# Patient Record
Sex: FEMALE | Race: OTHER | Employment: STUDENT | URBAN - METROPOLITAN AREA
[De-identification: names, ages, dates, MRNs, and addresses within clinical notes are randomized per-mention and may not be internally consistent; named-entity substitution may affect disease eponyms.]

---
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
## 2024-01-16 RX ORDER — TRETINOIN 1 MG/G
1 CREAM TOPICAL
Refills: 0 | OUTPATIENT
Start: 2024-01-16

## 2024-03-08 ENCOUNTER — OFFICE VISIT (OUTPATIENT)
Dept: DERMATOLOGY CLINIC | Facility: CLINIC | Age: 19
End: 2024-03-08

## 2024-03-08 DIAGNOSIS — L70.0 ACNE VULGARIS: Primary | ICD-10-CM

## 2024-03-08 PROCEDURE — 99213 OFFICE O/P EST LOW 20 MIN: CPT | Performed by: DERMATOLOGY

## 2024-03-08 RX ORDER — CLINDAMYCIN PHOSPHATE 10 UG/ML
1 LOTION TOPICAL 2 TIMES DAILY
Qty: 60 ML | Refills: 5 | Status: SHIPPED | OUTPATIENT
Start: 2024-03-08

## 2024-03-08 RX ORDER — TRETINOIN 1 MG/G
CREAM TOPICAL
Qty: 20 G | Refills: 3 | Status: SHIPPED | OUTPATIENT
Start: 2024-03-08

## 2024-03-08 RX ORDER — SPIRONOLACTONE 25 MG/1
TABLET ORAL
Qty: 60 TABLET | Refills: 3 | Status: SHIPPED | OUTPATIENT
Start: 2024-03-08

## 2024-03-18 NOTE — PROGRESS NOTES
Ambreen Moeller is a 18 year old female.    Chief Complaint   Patient presents with    Acne     LOV 4/8/22. Pt presenting for f/u on acne. States she has only been using OTC acne cleansers. Pt would like to continue with past tx regimen which was Clindamycin Phosphate 1 %  ,Doxycycline Hyclate 100 MG, and Tretinoin 0.5%             Patient has no known allergies.  Current Outpatient Medications   Medication Sig Dispense Refill    spironolactone 25 MG Oral Tab 1 po every day x 3 days then 2 po every day x 2 weeks then increase to 4 tabs po every day 60 tablet 3    clindamycin 1 % External Lotion Apply 1 Application  topically 2 (two) times daily. APPLY TO AFFECTED AREA 60 mL 5    Tretinoin 0.1 % External Cream Apply at bedtime as directed 20 g 3      Past Medical History:   Diagnosis Date    Acute appendicitis with rupture     Acute meniscal tear of left knee, subsequent encounter 5/3/2017    Closed left tibial fracture       Social History:  Social History     Socioeconomic History    Marital status: Single   Tobacco Use    Smoking status: Never    Smokeless tobacco: Never   Substance and Sexual Activity    Alcohol use: No    Drug use: No   Other Topics Concern    Second-hand smoke exposure No    Alcohol/drug concerns No    Violence concerns No    Reaction to local anesthetic No                 Current Outpatient Medications   Medication Sig Dispense Refill    spironolactone 25 MG Oral Tab 1 po every day x 3 days then 2 po every day x 2 weeks then increase to 4 tabs po every day 60 tablet 3    clindamycin 1 % External Lotion Apply 1 Application  topically 2 (two) times daily. APPLY TO AFFECTED AREA 60 mL 5    Tretinoin 0.1 % External Cream Apply at bedtime as directed 20 g 3     Allergies:   No Known Allergies    Past Medical History:   Diagnosis Date    Acute appendicitis with rupture     Acute meniscal tear of left knee, subsequent encounter 5/3/2017    Closed left tibial fracture      Past Surgical History:    Procedure Laterality Date    KNEE SURGERY Left     LAPAROSCOPIC APPENDECTOMY  05/06/2018     Social History     Socioeconomic History    Marital status: Single     Spouse name: Not on file    Number of children: Not on file    Years of education: Not on file    Highest education level: Not on file   Occupational History    Not on file   Tobacco Use    Smoking status: Never    Smokeless tobacco: Never   Substance and Sexual Activity    Alcohol use: No    Drug use: No    Sexual activity: Not on file   Other Topics Concern    Second-hand smoke exposure No    Alcohol/drug concerns No    Violence concerns No    Grew up on a farm Not Asked    History of tanning Not Asked    Outdoor occupation Not Asked    Breast feeding Not Asked    Reaction to local anesthetic No   Social History Narrative    Not on file     Social Determinants of Health     Financial Resource Strain: Not on file   Food Insecurity: Not on file   Transportation Needs: Not on file   Physical Activity: Not on file   Stress: Not on file   Social Connections: Not on file   Housing Stability: Not on file     Family History   Problem Relation Age of Onset    Hypertension Father     Other (Other) Father         pre diabetes    Diabetes Mother         Type 2    Heart Disease Neg     Cancer Neg     Heart Disorder Neg                       HPI :      Chief Complaint   Patient presents with    Acne     LOV 4/8/22. Pt presenting for f/u on acne. States she has only been using OTC acne cleansers. Pt would like to continue with past tx regimen which was Clindamycin Phosphate 1 %  ,Doxycycline Hyclate 100 MG, and Tretinoin 0.5%     Felt previous regimen was working  0.1% tretinoin had worked better    Patient presents with concerns above.    Past notes/ records and appropriate/relevant lab results including pathology and past body maps reviewed. Updated and new information noted in current visit.       ROS:    Denies any other systemic complaints.  No fevers, chills,  night sweats, sensitivity to the sun, deeper lumps or bumps.  No other skin complaints.  History, medications, allergies as noted.    Physical examination: Patient  well-developed well-nourished, alert oriented in no acute distress.  Exam of involved, appropriate areas of skin performed, including scalp, head, neck, face,nails, hair, external eyes, including conjunctival mucosa, eyelids, lips, external ears, back, chest, abdomen, arms, legs, palms.  Remarkable for lesions as noted   See map for details acne scattered lesions over the chest back, more diffuse nodules over the cheeks chin and jawline postinflammatory changes grade 2     ASSESSMENT AND PLAN:     Encounter Diagnosis   Name Primary?    Acne vulgaris Yes       Assessment / plan:    Acne. See medications in grid.  Skin care regimen discussed at length including cleansers, makeup, face washing, sunscreen.  Recheck in 6 -8 weeks if no improvement.  Notify us promptly if problems tolerating regimen.  Consider more aggressive therapy if not responding.    Nodule inflammatory grade 2 postinflammatory changes grade 2 areas concerning for early scarring more prominent lesions along the chin and jawline     Aldactone should be taken with caution against pregnancy as it can cause male genitourinary defects with pregnancy. Usual side effects --possible more frequent urination, lower blood pressure, possible breast tenderness, irregular menses.    Consider adding OCP    Discussed use of topical retinoids.  Mild cleanser, moisturizer underneath medication.  Use sparingly not more than pea size amount for the entire face start every 2-3 nights increasing slowly over several weeks to nightly as tolerated.  Need for chronic use over several months to see maximum benefit.  Risk of dryness, redness ,peeling irritation tenderness sun sensitivity discussed.  Topical vitamin A's s should not be used in pregnancy.    Topicals reviewed    Pathophysiology discussed with patient.   Therapeutic options reviewed.  See  Medications in grid.  Instructions reviewed at length.     General skin care questions answered.   Reassurance regarding benign skin lesions.Signs and symptoms of skin cancer, ABCDE's of melanoma briefly reviewed.  Sunscreen use (broad-spectrum, ideally mineral, UVA UVB coverage SPF 30 or greater recommended), sun protection, encouraged.  Followup as noted in rtc or p.r.n.    Encounter Times new patient  Including precharting, reviewing chart, prior notes obtaining history: 10 minutes, medical exam :10 minutes, notes on body map, plan, counseling 10minutes My total time spent caring for the patient on the day of the encounter: 30 minutes     The patient indicates understanding of these issues and agrees to the plan.  The patient is asked to return as noted in follow-up /as noted above    This note was generated using Dragon voice recognition software.  Please contact me regarding any confusion resulting from errors in recognition.  Note to patient and family: The  Century Cures Act makes medical notes like these available to patients. However, be advised this is a medical document. It is intended as [id number] communication and monitoring of a patient's care needs. It is written in medical language and may contain abbreviations or verbiage that are unfamiliar. It may appear blunt or direct. Medical documents are intended to carry relevant information, facts as evident and the clinical opinion of the practitioner.  No orders of the defined types were placed in this encounter.      Meds & Refills for this Visit:   Requested Prescriptions     Signed Prescriptions Disp Refills    spironolactone 25 MG Oral Tab 60 tablet 3     Si po every day x 3 days then 2 po every day x 2 weeks then increase to 4 tabs po every day    clindamycin 1 % External Lotion 60 mL 5     Sig: Apply 1 Application  topically 2 (two) times daily. APPLY TO AFFECTED AREA    Tretinoin 0.1 % External Cream  20 g 3     Sig: Apply at bedtime as directed       Encounter Diagnosis   Name Primary?    Acne vulgaris Yes       No orders of the defined types were placed in this encounter.      Results From Past 48 Hours:  No results found for this or any previous visit (from the past 48 hour(s)).    Meds This Visit:      Imaging Orders:  None     Referral Orders:  No orders of the defined types were placed in this encounter.

## 2024-05-06 NOTE — TELEPHONE ENCOUNTER
Refill Request for medication(s):     Last Office Visit: 3/8/24    Last Refill:     Pharmacy, Dosage verified:     Condition Update (if applicable):     Rx pended and sent to provider for approval, please advise. Thank You!

## 2024-05-13 RX ORDER — SPIRONOLACTONE 25 MG/1
TABLET ORAL
Qty: 120 TABLET | Refills: 3 | Status: SHIPPED | OUTPATIENT
Start: 2024-05-13

## 2024-05-13 NOTE — TELEPHONE ENCOUNTER
Refill Request for medication(s):     Last Office Visit: 3/8/2024    Last Refill: 3/8/2024    Pharmacy, Dosage verified: mychart sent    Condition Update (if applicable): mychart sent

## 2024-05-16 RX ORDER — SPIRONOLACTONE 25 MG/1
TABLET ORAL
Qty: 120 TABLET | Refills: 3 | Status: SHIPPED | OUTPATIENT
Start: 2024-05-16

## 2024-05-16 NOTE — TELEPHONE ENCOUNTER
Refill sent for the 4 tablets daily.  If she does break out further possible to increase slightly again.  Continue on this dose for now.  Be careful with hot weather to drink adequate fluids.  Update us if flaring.  Good to hear this is working

## 2024-10-12 ENCOUNTER — HOSPITAL ENCOUNTER (OUTPATIENT)
Age: 19
Discharge: HOME | End: 2024-10-12

## 2024-10-12 VITALS
SYSTOLIC BLOOD PRESSURE: 134 MMHG | DIASTOLIC BLOOD PRESSURE: 87 MMHG | OXYGEN SATURATION: 99 % | TEMPERATURE: 99 F | RESPIRATION RATE: 16 BRPM | HEART RATE: 103 BPM

## 2024-10-12 DIAGNOSIS — R03.0 ELEVATED BLOOD PRESSURE READING: ICD-10-CM

## 2024-10-12 DIAGNOSIS — J02.9 ACUTE PHARYNGITIS, UNSPECIFIED ETIOLOGY: Primary | ICD-10-CM

## 2024-10-12 LAB
POCT INFLUENZA A: NEGATIVE
POCT INFLUENZA B: NEGATIVE
S PYO AG THROAT QL: NEGATIVE

## 2024-10-12 RX ORDER — IBUPROFEN 600 MG/1
TABLET, FILM COATED ORAL
Qty: 20 TABLET | Refills: 0 | Status: SHIPPED | OUTPATIENT
Start: 2024-10-12

## 2024-10-12 NOTE — ED PROVIDER NOTES
Patient Seen in: [facility]    History     Chief Complaint   Patient presents with    Sore Throat     Stated Complaint: SORE THROAT    HPI    Ambreen Moeller is a 19 year old female presents with chief complaint of sore throat.  Onset 4 days ago.  Patient reports associated sinus pain, cough and \"feeling hot\".  Patient states they are tolerating solid food and oral liquids.  Patient denies chills, earache, trismus, drooling, neck pain, restricted neck movement, neck swelling, rash, dyspnea, wheeze, abdominal pain, nausea, vomiting, diarrhea, constipation, weakness, paresthesias, vision changes, altered mental status, loss of consciousness, amnesia.      Past Medical History:    Acute appendicitis with rupture    Acute meniscal tear of left knee, subsequent encounter    Closed left tibial fracture       Past Surgical History:   Procedure Laterality Date    Knee surgery Left     Laparoscopic appendectomy  05/06/2018            Family History   Problem Relation Age of Onset    Hypertension Father     Other (Other) Father         pre diabetes    Diabetes Mother         Type 2    Heart Disease Neg     Cancer Neg     Heart Disorder Neg        Social History     Socioeconomic History    Marital status: Single   Tobacco Use    Smoking status: Never    Smokeless tobacco: Never   Vaping Use    Vaping status: Never Used   Substance and Sexual Activity    Alcohol use: Yes    Drug use: No   Other Topics Concern    Second-hand smoke exposure No    Alcohol/drug concerns No    Violence concerns No    Reaction to local anesthetic No       Review of Systems    Positive for stated complaint: SORE THROAT  Other systems are as noted in HPI.  Constitutional and vital signs reviewed.      All other systems reviewed and negative except as noted above.    PSFH elements reviewed from today and agreed except as otherwise stated in HPI.    Physical Exam     ED Triage Vitals [10/12/24 1448]   /85   Pulse 117   Resp 16    Temp 98.5 °F (36.9 °C)   Temp src Temporal   SpO2 99 %   O2 Device None (Room air)       Current:/87   Pulse 103   Temp 98.5 °F (36.9 °C) (Temporal)   Resp 16   LMP 09/22/2024 (Exact Date)   SpO2 99%     PULSE OX within normal limits on room air as interpreted by this provider.     Constitutional: The patient is cooperative. Appears well-developed and well-nourished.  Mild discomfort.  Psychological: Alert, No abnormalities of mood, affect.  Head: Normocephalic/atraumatic.    Eyes: Pupils are equal round reactive to light.  Conjunctiva are within normal limits.  ENT: Pharynx injected.  Tonsils within normal size limits bilaterally.  No tonsillar exudates.  Uvula midline.  No trismus.  No drooling.  TMs within normal limits bilaterally.  Mucous membranes moist.  Neck: The neck is supple.  Nontender.  No meningeal signs.  Chest: There is no tenderness to the chest wall.  No CVA tenderness bilaterally.  Respiratory: Respiratory effort was normal.  There is no rales, wheezes, or rhonchi.  There is no stridor.  Air entry is equal.  Cardiovascular: Tachycardic, regular rhythm.  Capillary refill is brisk.    Genitourinary: Not examined.  Lymphatic: No gross lymphadenopathy noted.  Musculoskeletal: Musculoskeletal system is grossly intact.  There is no obvious deformity.  Neurological: No facial asymmetry.  Normal gait.  Normal sensory exam.  Patient exhibits normal speech.  Strength and range of motion symmetrical of all extremities x4.  Skin: Skin is normal to inspection.  Warm and dry.  No obvious bruising.  No obvious rash.        ED Course     Labs Reviewed   POCT RAPID STREP - Normal   POCT FLU TEST - Normal    Narrative:     This assay is a rapid molecular in vitro test utilizing nucleic acid amplification of influenza A and B viral RNA.       MDM     Differential diagnosis prior to work-up including but not limited to strep pharyngitis, viral pharyngitis, URI, bronchitis, pneumonia    Patient declined  chest x-rays.    Rapid strep negative.  Influenza negative.    Physical exam remained stable over serial reexaminations as previously documented.  Results reviewed with patient.    I have given the patient instructions regarding their diagnoses, expectations, follow up, and ER precautions. I explained to the patient that emergent conditions may arise and to go to the ER for new, worsening or any persistent conditions. I've explained the importance of following up with their doctor as instructed. The patient verbalized understanding of the discharge instructions and plan.        Disposition and Plan     Clinical Impression:  1. Acute pharyngitis, unspecified etiology    2. Elevated blood pressure reading        Disposition:  Discharge    Follow-up:  Rebecca Jasso MD  [address] [address] [phone]    Call in 1 day  For follow-up      Medications Prescribed:  Current Discharge Medication List        START taking these medications    Details   ibuprofen 600 MG Oral Tab Take 1 tablet (600 mg total) by mouth every 6 hours with food  Qty: 20 tablet, Refills: 0      phenol 1.4 % Mouth/Throat Liquid Use as directed 1 mL in the mouth or throat every 2 (two) hours as needed. For 5 days  Qty: 177 mL, Refills: 0

## 2024-10-12 NOTE — ED INITIAL ASSESSMENT (HPI)
Pt with sinus pressure, cough, and feeling hot for 4 days. Pt reports waking up with R sided throat pain 8/10.

## 2024-12-18 NOTE — TELEPHONE ENCOUNTER
Refill Request for medication(s): SPIRONOLACTONE 25 MG TABLET     Last Office Visit: 03/08/24    Last Refill: 05/16/24    Pharmacy, Dosage verified: [facility]/PHARMACY #[id number] - [place], [place] - [address] AT [facility], [phone], [phone]    Condition Update (if applicable): cyndee msg.     Rx pended and sent to provider for approval, please advise. Thank You!

## 2024-12-20 RX ORDER — SPIRONOLACTONE 25 MG/1
TABLET ORAL
Qty: 120 TABLET | Refills: 2 | Status: SHIPPED | OUTPATIENT
Start: 2024-12-20

## 2025-01-09 RX ORDER — CLINDAMYCIN PHOSPHATE 10 UG/ML
1 LOTION TOPICAL 2 TIMES DAILY
Qty: 60 ML | Refills: 3 | Status: SHIPPED | OUTPATIENT
Start: 2025-01-09

## 2025-01-09 RX ORDER — TRETINOIN 1 MG/G
CREAM TOPICAL
Qty: 20 G | Refills: 2 | Status: SHIPPED | OUTPATIENT
Start: 2025-01-09

## 2025-01-09 NOTE — TELEPHONE ENCOUNTER
Refill Request for medication(s):   clindamycin 1 % External Lotion     Tretinoin 0.1 % External Cream       Last Office Visit: 03/08/24    Last Refill: 03/08/24    Pharmacy, Dosage verified: [facility]/PHARMACY #[id number] - CLOSED - [place], [place] - [address] [facility] AT [facility], [phone], [phone]     Condition Update (if applicable): MyChart msg sent to patient.    Rx pended and sent to provider for approval, please advise. Thank You!

## 2025-04-19 RX ORDER — SPIRONOLACTONE 25 MG/1
TABLET ORAL
Qty: 120 TABLET | Refills: 2 | Status: SHIPPED | OUTPATIENT
Start: 2025-04-19

## 2025-04-19 NOTE — TELEPHONE ENCOUNTER
LOV 3/8/24 - Pt was advised to schedule appt with last refill and still hasn't scheduled an appt?  Do we refuse refill until appt is scheduled?  Please advise.

## 2025-08-11 RX ORDER — SPIRONOLACTONE 25 MG/1
TABLET ORAL
Qty: 120 TABLET | Refills: 2 | OUTPATIENT
Start: 2025-08-11

## (undated) DEVICE — CHLORAPREP 26ML APPLICATOR

## (undated) DEVICE — SUTURE VICRYL 1 CT-1

## (undated) DEVICE — C-ARMOR C-ARM EQUIPMENT COVERS CLEAR STERILE UNIVERSAL FIT 12 PER CASE: Brand: C-ARMOR

## (undated) DEVICE — UNDYED BRAIDED (POLYGLACTIN 910), SYNTHETIC ABSORBABLE SUTURE: Brand: COATED VICRYL

## (undated) DEVICE — 3M™ STERI-STRIP™ COMPOUND BENZOIN TINCTURE 40 BAGS/CARTON 4 CARTONS/CASE C1544: Brand: 3M™ STERI-STRIP™

## (undated) DEVICE — 3M™ STERI-STRIP™ REINFORCED ADHESIVE SKIN CLOSURES, R1547, 1/2 IN X 4 IN (12 MM X 100 MM), 6 STRIPS/ENVELOPE: Brand: 3M™ STERI-STRIP™

## (undated) DEVICE — LAP CHOLE: Brand: MEDLINE INDUSTRIES, INC.

## (undated) DEVICE — LOWER EXTREMITY: Brand: MEDLINE INDUSTRIES, INC.

## (undated) DEVICE — LIGASURE LAP MARYLAND 37CM

## (undated) DEVICE — SUTURE VLOC 90 3-0 9\" 0644

## (undated) DEVICE — SUTURE ETHIBOND 1 OS-4

## (undated) DEVICE — STERILE LATEX POWDER-FREE SURGICAL GLOVESWITH NITRILE COATING: Brand: PROTEXIS

## (undated) DEVICE — 3M™ STERI-DRAPE™ U-DRAPE 1015: Brand: STERI-DRAPE™

## (undated) DEVICE — NEEDLE SPINAL 18X3-1/2 PINK.

## (undated) DEVICE — SUTURE MONOCRYL 4-0 Y835G

## (undated) DEVICE — DRAIN RESERVOIR RELIAVAC 100CC

## (undated) DEVICE — SPLINT ORTH 20FTX4IN 15 LYR PD

## (undated) DEVICE — SUTURE VICRYL 2-0 FS-1

## (undated) DEVICE — OCCLUSIVE GAUZE STRIP OVERWRAP,3% BISMUTH TRIBROMOPHENATE IN PETROLATUM BLEND: Brand: XEROFORM

## (undated) DEVICE — DRAPE C-ARM UNIVERSAL

## (undated) DEVICE — GOWN SURG AERO BLUE PERF LG

## (undated) DEVICE — SOL  .9 3000ML

## (undated) DEVICE — SUTURE VICRYL 0 CP-1

## (undated) DEVICE — TISSUE RETRIEVAL SYSTEM: Brand: INZII RETRIEVAL SYSTEM

## (undated) DEVICE — BATTERY

## (undated) DEVICE — SUTURE ETHILON 3-0 669H

## (undated) DEVICE — NEEDLE HPO 18GA 1.5IN ECLPS

## (undated) DEVICE — BLADE SHVR COOLCUT 3.8MM 2 CUT

## (undated) DEVICE — [AGGRESSIVE PLUS CUTTER, ARTHROSCOPIC SHAVER BLADE,  DO NOT RESTERILIZE,  DO NOT USE IF PACKAGE IS DAMAGED,  KEEP DRY,  KEEP AWAY FROM SUNLIGHT]: Brand: FORMULA

## (undated) DEVICE — ZIMMER® STERILE DISPOSABLE TOURNIQUET CUFF WITH PLC, DUAL PORT, SINGLE BLADDER, 30 IN. (76 CM)

## (undated) DEVICE — 10K ARTHROSCOPY INFLOW/OUTFLOW TUBE SET: Brand: 10K

## (undated) DEVICE — SUCTION CANISTER, 3000CC,SAFELINER: Brand: DEROYAL

## (undated) DEVICE — SUTURE VICRYL 0 UR-6

## (undated) DEVICE — WEBRIL COTTON UNDERCAST PADDING: Brand: WEBRIL

## (undated) DEVICE — TROCAR: Brand: KII® SLEEVE

## (undated) DEVICE — [HIGH FLOW INSUFFLATOR,  DO NOT USE IF PACKAGE IS DAMAGED,  KEEP DRY,  KEEP AWAY FROM SUNLIGHT,  PROTECT FROM HEAT AND RADIOACTIVE SOURCES.]: Brand: PNEUMOSURE

## (undated) DEVICE — THE ECHELON, ECHELON ENDOPATH™ AND ECHELON FLEX™ FAMILIES OF ENDOSCOPIC LINEAR CUTTERS AND RELOADS ARE STERILE, SINGLE PATIENT USE INSTRUMENTS THAT SIMULTANEOUSLY CUT AND STAPLE TISSUE. THERE ARE SIX STAGGERED ROWS OF STAPLES, THREE ON EITHER SIDE OF THE CUT LINE. THE 45 MM INSTRUMENTS HAVE A STAPLE LINE THATIS APPROXIMATELY 45 MM LONG AND A CUT LINE THAT IS APPROXIMATELY 42 MM LONG. THE SHAFT CAN ROTATE FREELY IN BOTH DIRECTIONS AND AN ARTICULATION MECHANISM ON ARTICULATING INSTRUMENTS ENABLES BENDING THE DISTAL PORTIONOF THE SHAFT TO FACILITATE LATERAL ACCESS OF THE OPERATIVE SITE.THE INSTRUMENTS ARE SHIPPED WITHOUT A RELOAD AND MUST BE LOADED PRIOR TO USE. A STAPLE RETAINING CAP ON THE RELOAD PROTECTS THE STAPLE LEG POINTS DURING SHIPPING AND TRANSPORTATION. THE INSTRUMENTS’ LOCK-OUT FEATURE IS DESIGNED TO PREVENT A USED RELOAD FROM BEING REFIRED.: Brand: ECHELON ENDOPATH

## (undated) DEVICE — DRAIN INCS 10MM 20CMX10MM RLVC

## (undated) DEVICE — AMBIENT SUPER TURBOVAC 90 IFS: Brand: COBLATION

## (undated) DEVICE — THE ECHELON FLEX POWERED PLUS ARTICULATING ENDOSCOPIC LINEAR CUTTERS ARE STERILE, SINGLE PATIENT USE INSTRUMENTS THAT SIMULTANEOUSLYCUT AND STAPLE TISSUE. THERE ARE SIX STAGGERED ROWS OF STAPLES, THREE ON EITHER SIDE OF THE CUT LINE. THE ECHELON FLEX 45 POWERED PLUSINSTRUMENTS HAVE A STAPLE LINE THAT IS APPROXIMATELY 45 MM LONG AND A CUT LINE THAT IS APPROXIMATELY 42 MM LONG. THE SHAFT CAN ROTATE FREELYIN BOTH DIRECTIONS AND AN ARTICULATION MECHANISM ENABLES THE DISTAL PORTION OF THE SHAFT TO PIVOT TO FACILITATE LATERAL ACCESS TO THE OPERATIVESITE.THE INSTRUMENTS ARE PACKAGED WITH A PRIMARY LITHIUM BATTERY PACK THAT MUST BE INSTALLED PRIOR TO USE. THERE ARE SPECIFIC REQUIREMENTS FORDISPOSING OF THE BATTERY PACK. REFER TO THE BATTERY PACK DISPOSAL SECTION.THE INSTRUMENTS ARE PACKAGED WITHOUT A RELOAD AND MUST BE LOADED PRIOR TO USE. A STAPLE RETAINING CAP ON THE RELOAD PROTECTS THE STAPLE LEGPOINTS DURING SHIPPING AND TRANSPORTATION. THE INSTRUMENTS’ LOCK-OUT FEATURE IS DESIGNED TO PREVENT A USED OR IMPROPERLY INSTALLED RELOADFROM BEING REFIRED OR AN INSTRUMENT FROM BEING FIRED WITHOUT A RELOAD.: Brand: ECHELON FLEX

## (undated) DEVICE — ABDOMINAL BINDER: Brand: DEROYAL

## (undated) DEVICE — 3M™ IOBAN™ 2 ANTIMICROBIAL INCISE DRAPE 6650EZ: Brand: IOBAN™ 2

## (undated) DEVICE — TROCAR: Brand: KII FIOS FIRST ENTRY

## (undated) DEVICE — SOL  .9 1000ML BTL

## (undated) DEVICE — STERILE TETRA-FLEX CF, ELASTIC BANDAGE, 4" X 5.5YD: Brand: TETRA-FLEX™CF

## (undated) DEVICE — GAUZE SPONGES,12 PLY: Brand: CURITY

## (undated) DEVICE — 3M™ SYNTHETIC CAST STOCKINET, MS04, 4 INCH X 25 YARD (10.1CM X 22.8M), 1 ROLL/CASE: Brand: 3M™

## (undated) DEVICE — REM POLYHESIVE ADULT PATIENT RETURN ELECTRODE: Brand: VALLEYLAB

## (undated) DEVICE — INTENDED FOR TISSUE SEPARATION, AND OTHER PROCEDURES THAT REQUIRE A SHARP SURGICAL BLADE TO PUNCTURE OR CUT.: Brand: BARD-PARKER ® STAINLESS STEEL BLADES

## (undated) DEVICE — TROCARS: Brand: KII® BALLOON BLUNT TIP SYSTEM

## (undated) DEVICE — TETRA-FLEX CF WOVEN LATEX FREE ELASTIC BANDAGE 6" X 5.5 YD: Brand: TETRA-FLEX™CF

## (undated) DEVICE — COVER SGL STRL LGHT HNDL BLU

## (undated) DEVICE — 60 ML SYRINGE LUER-LOCK TIP: Brand: MONOJECT

## (undated) DEVICE — FLEXIBLE ADHESIVE BANDAGE,KNUCKLE: Brand: CURITY

## (undated) DEVICE — SUTURE ETHIBOND 1 CT-1

## (undated) DEVICE — DRAIN SILICONE FLAT 10MM

## (undated) DEVICE — COTTON UNDERCAST PADDING,REGULAR FINISH: Brand: WEBRIL

## (undated) NOTE — LETTER
VACCINE ADMINISTRATION RECORD  PARENT / GUARDIAN APPROVAL  Date: 2021  Vaccine administered to: Keri School     : 2005    MRN: [id number]    A copy of the appropriate Centers for Disease Control and Prevention Vaccine Information statement

## (undated) NOTE — MR AVS SNAPSHOT
[address]  [address] [place] [phone]               Thank you for choosing us for your health care visit with Megan Mina PT.   We are glad to serve you and happy to provide you with this summary o [facility] Visit By Therapist with Man Munoz PT   [facility] in [facility] ([address] [facility] [address] [facility])    [address] [facility] [phone]           Please arrive at your scheduled appointment time.   Wear co

## (undated) NOTE — LETTER
VACCINE ADMINISTRATION RECORD  PARENT / GUARDIAN APPROVAL  Date: 2018  Vaccine administered to: Chuy Cruz     : 2005    MRN: [id number]    A copy of the appropriate Centers for Disease Control and Prevention Vaccine Information statement

## (undated) NOTE — LETTER
9/14/2017          To Whom It May Concern:    Rumaldo Riedel is currently under my medical care. Melissa Swain may return to some sports/gym activities.      She is not to participate in activities that will require pivoting and fast quick change of directi

## (undated) NOTE — MR AVS SNAPSHOT
[address]  [address] [facility] [phone]               Thank you for choosing us for your health care visit with Mike Arellano PTA.   We are glad to serve you and happy to provide you with this summ [address] [place] [phone]           Please arrive at your scheduled appointment time. Wear comfortable, loose fitting clothing.             Jun 26, 2017  5:30 PM   [facility] Visit By Therapist with Jake Forrest PT

## (undated) NOTE — LETTER
VACCINE ADMINISTRATION RECORD  PARENT / GUARDIAN APPROVAL  Date: 2022  Vaccine administered to: Layo Lewis     : 2005    MRN: [id number]    A copy of the appropriate Centers for Disease Control and Prevention Vaccine Information statement has been provided. I have read or have had explained the information about the diseases and the vaccines listed below. There was an opportunity to ask questions and any questions were answered satisfactorily. I believe that I understand the benefits and risks of the vaccine cited and ask that the vaccine(s) listed below be given to me or to the person named above (for whom I am authorized to make this request). VACCINES ADMINISTERED:  MEN B    I have read and hereby agree to be bound by the terms of this agreement as stated above. My signature is valid until revoked by me in writing. This document is signed by self, relationship: Self on 2022.:                                                                                                                                         Parent / Guardian Signature                                                Date    Key HUBBARD MA served as a witness to authentication that the identity of the person signing electronically is in fact the person represented as signing. This document was generated by Key HUBBARD MA on 2022.

## (undated) NOTE — LETTER
Name:  Perri Gill Year:  8th Grade Class: Student ID No.:   Address:  [address] [place] [address]  [address] [facility] [address] Phone:  [phone] (home)  :  14year old   Name Relationship [address] Work Phone Home Phone Mobile Phone   1.  Cullen April 13. Does anyone in your family have a heart problem, pacemaker, or implanted defibrillator? 12. Has anyone in your family had unexplained fainting, seizures, or near drowning?      BONE AND JOINT QUESTIONS Yes No   17. Have you ever had an injury to a b after being hit or falling? 39.Have you ever been unable to move your arms / legs after being hit /fall? 40. Have you ever become ill while exercising in the heat?     41. Do you get frequent muscle cramps when exercising? 42.  Do you or someone · Murmurs (auscultation standing, supine, +/- Valsalva)  · Location of point of maximal impulse (PMI) Yes    Pulses Yes    Lungs Yes    Abdomen Yes    Genitourinary (males only)* N/A    Skin:  HSV, lesions suggestive of MRSA, tinea corporis Yes    Neurolog may be asked to submit to testing for the presence of performance-enhancing substances in my/his/her body either during [employer] state series events or during the school day, and I/our student do/does hereby agree to submit to such testing and analysis by a ce

## (undated) NOTE — LETTER
[facility] [employer] of Child Health Examination       Student's Name  Harley Bowling Title        MD                   Date  7/08/2021   Signature                                                                                                                                              Title                           Date    (If adding da CARE PROVIDER    ALLERGIES  (Food, drug, insect, other)  Patient has no known allergies. MEDICATION  (List all prescribed or taken on a regular basis.)  No current outpatient medications on file. Diagnosis of asthma?   Child wakes during the night coughin BMI>85% age/sex  Yes And any two of the following:  Family History Yes    Ethnic Minority  Yes          Signs of Insulin Resistance (hypertension, dyslipidemia, polycystic ovarian syndrome, acanthosis nigricans)    No           At Risk  Yes   Lead Risk Vignesh Pedro Antagonist): No          Controller medication (e.g. inhaled corticosteroid):   No Other   NEEDS/MODIFICATIONS required in the school setting  None DIETARY Needs/Restrictions     None   SPECIAL INSTRUCTIONS/DEVICES e.g. safety glasses, glass eye, chest pro

## (undated) NOTE — IP AVS SNAPSHOT
[facility] - [facility]    [address], [place] ~ [phone]                Discharge Summary   4/27/2017    [facility]           Admission Information        Provider Department    4/27/2017 Will MD [facility] Pre- any pressure on your arm or hand for 24 hours    It is normal:  · For you to have a sore throat if you had a breathing tube during surgery (while you were asleep!). The sore throat should get better within 48 hours.  You can gargle with warm salt water (1/2 The questionnaire should take no longer than a few minutes to complete and your answers are private. Your health and feedback are important to us!     If you receive a NSQIP questionnaire, and have questions please contact:   Corie Saleem RN, MA, [phone]. - If you don’t have insurance, Emerson Torres has partnered with Patient [address] to help you get signed up for insurance coverage.   Patient [employer] is a Federal Navigator program that can help with your Affordab

## (undated) NOTE — MR AVS SNAPSHOT
After Visit Summary   4/26/2017    Daniel Horvath    MRN: [id number]           Visit Information        Provider Department Dept Phone    4/26/2017  1:20 PM Alejandro Owens MD [facility] [phone]      Allergies as of 4/26/2017  Reviewe

## (undated) NOTE — MR AVS SNAPSHOT
After Visit Summary   5/3/2017    Daniel Horvath    MRN: [id number]           Visit Information        Provider Department Dept Phone    5/3/2017  3:00 PM Alejandro Owens MD [facility] [phone]      Allergies as of 5/3/2017  Reviewed o

## (undated) NOTE — MR AVS SNAPSHOT
[address]  [address] [facility]  [phone]               Thank you for choosing us for your [facility]   We are glad to serve you and happy to provide you with this summary o Treatment helps rest the muscles and joint. It also helps relieve symptoms and restore function. Depending on the type of problem you have, your treatment plan may include:  · Temporary diet changes.   · New habits for managing stress and maintaining the he · Is my posture healthy for my body? · Is there anything I can do to make myself more comfortable? If you answer “yes” to any of the questions above, you need to take action.  Adjusting your posture or taking a short break can help prevent or relieve TMD © 0211-8434 The [address] [place] [address], [address]. All rights reserved. This information is not intended as a substitute for professional medical care. Always follow your healthcare professional's instructions.         Pain Re serotonin, a body chemical that improves sleep. This in turn can decrease bruxism during the night. Treating painful muscles  A trigger point is a painful spot in a tight muscle. It is often painful to the touch and may refer pain to other places.  Your he substitute for professional medical care. Always follow your healthcare professional's instructions. Los trastornos temporomandibulares (TTM)  ¿Tiene dolor en la sushant, en la mandíbula o en los dientes? ¿Tiene dificultades para masticar?  ¿Oye chasqui · Medicamentos para reducir el dolor y la inflamación. · Fisioterapia para reducir la presión [employer] articulación y restablecer aldana funcionamiento. · Tratamiento dental para reducir la presión sobre la articulación.   ¿Cómo puede evitar problemas en un f · ¿Tengo los músculos tensos? · ¿Me rechinan los dientes o estoy apretando la mandíbula? · ¿Estoy sentado en lisa buena postura para mi cuerpo? · ¿Hay algo que pueda hacer para sentirme más cómodo?   Si ha respondido afirmativamente a [place] de las pregun tejidos en buena forma. John Paul a ello, aldana cuerpo podrá recuperarse más rápidamente y será menos probable que vuelva a lesionarse.  Estos son algunos consejos para empezar:  · Consulte a aldana proveedor de atención médica antes de iniciar un programa de ejerci medicamentos indicados normalmente para el tratamiento de TTM son:  · Antiinflamatorios y analgésicos. Tratan el dolor, la inflamación, la osteoartritis y la artritis reumatoide.  Los antiinflamatorios reducen la hinchazón, el calor, el enrojecimiento y [facility] El tratamiento puede concentrarse directamente en la DANDRE. Existen diversas maneras de tratar esta articulación:  · Un programa de [employer] ayudará a tratar y controlar los síntomas por sí mismo.  Cruz programa puede incluir ejercicios, así eric e 70.761 kg (156 lb) (97 %*, Z = 1.89) 27.40 kg/m2 (97 %*, Z = 1.82)    *Growth percentiles are based on CDC 2-20 Years data     BP percentiles are based on 2000 NHANES data         Current Medications      Notice  As of 4/6/2017  5:19 PM    You have not be o Make it fun – find ways to engage your children such as:  o playing a game of tag  o cooking healthy meals together  o creating a rainbow shopping list to find colorful fruits and vegetables  o go on a walking scavenger hunt through the neighborhood   o

## (undated) NOTE — LETTER
[address], [place], [place]     AUTHORIZATION FOR SURGICAL OPERATION OR PROCEDURE    I hereby authorize Dr. Morton Epley, MD, my Physician(s) and whomever may be designated as the doctor's Assistant, to perform the following 4. I consent to the photographing of procedure(s) to be performed for the purposes of advancing medicine, science and/or education, provided my identity is not revealed.  If the procedure has been videotaped, the physician/surgeon will obtain the original v (Witness signature)                                                                                                  (Date)                                (Time)  STATEMENT OF PHYSICIAN My signature below affirms that prior to the time of the procedure;  I

## (undated) NOTE — LETTER
[facility] [employer] [facility] [place] [employer] of Child Health Examination       Student's Name  Tera Bowling Date  10/14/[birth year]   Signature                                                                                                                                              Title                           Date    (If adding dates to the above immunization histo ALLERGIES  (Food, drug, insect, other)  Patient has no known allergies. MEDICATION  (List all prescribed or taken on a regular basis.)  No current outpatient medications on file. Diagnosis of asthma?   Child wakes during the night coughing   Yes   No    Y adult)   Pulse 87   Ht 5' 5\" (1.651 m)   Wt 83 kg (183 lb)   BMI 30.45 kg/m²     DIABETES SCREENING  BMI>85% age/sex  Yes And any two of the following:  Family History No    Ethnic Minority  No          Signs of Insulin Resistance (hypertension, dyslipide Currently Prescribed Asthma Medication:            Quick-relief  medication (e.g. Short Acting Beta Antagonist): No          Controller medication (e.g. inhaled corticosteroid):   No Other   NEEDS/MODIFICATIONS required in the school setting  None DIET

## (undated) NOTE — MR AVS SNAPSHOT
After Visit Summary   6/21/2017    Alem Berman    MRN: [id number]           Visit Information        Provider Department Dept Phone    6/21/2017  2:00 PM Humberto Reyes MD [facility] [phone]      Allergies as of 6/21/2017  Reviewe patients. Video Visits are available Monday - Friday for many common conditions such as allergies, colds, cough, fever, rash, sore throat, headache and pink eye.   The cost for a Video Visit is currently $10.         If you receive a survey from [employer]

## (undated) NOTE — LETTER
Name:  Sabine Tillman Year:  12th Grade Class: Student ID No.:   Address:  [address] [place] [address]  [address] [facility] [address] Phone:  [phone] (home)  :  15year old   Name Relationship [address] Work Phone Home Phone Mobile Phone   1.  Zainab Posadas implanted defibrillator? 12. Has anyone in your family had unexplained fainting, seizures, or near drowning?      BONE AND JOINT QUESTIONS Yes No   17. Have you ever had an injury to a bone, muscle, ligament, or tendon that caused you to miss a practice arms / legs after being hit /fall? 40. Have you ever become ill while exercising in the heat?     41. Do you get frequent muscle cramps when exercising? 42. Do you or someone in your family have sickle cell trait or disease? 43.  Have you ever h Pulses Yes    Lungs Yes    Abdomen Yes    Genitourinary (males only)* N/A    Skin:  HSV, lesions suggestive of MRSA, tinea corporis Yes    Neurologic* Yes    MUSCULOSKELETAL     Neck Yes    Back Yes    Shoulder/arm Yes    Elbow/forearm Yes    Wrist/hand/fi body either during [employer] state series events or during the school day, and I/our student do/does hereby agree to submit to such testing and analysis by a certified laboratory.  We further understand and agree that the results of the performance-enhancing sub

## (undated) NOTE — MR AVS SNAPSHOT
[place]  [address]  [phone]               Thank you for choosing us for your health care visit with Edi Melo MD.  We are glad to serve you and happy to provide you with this aldana This list is accurate as of: 4/13/17  1:18 PM.  Always use your most recent med list.                Acetaminophen-Codeine #3 300-30 MG Tabs   Take 1 tablet by mouth every 6 (six) hours as needed for Pain.    Commonly known as:  TYLENOL #3                [id number]

## (undated) NOTE — MR AVS SNAPSHOT
[address]  [address] [facility] [phone]               Thank you for choosing us for your health care visit with Carlos Alberto Noriega PTA.   We are glad to serve you and happy to provide you with this summ [place] [facility] By Therapist with [facility] in [address] [place] [address] ([address] [facility],[address])    150 [facility] [phone]           Please arrive at your scheduled appointment time.   We

## (undated) NOTE — MR AVS SNAPSHOT
[address]  [address] [place] [phone]               Thank you for choosing us for your health care visit with [facility] PT.   We are glad to serve you and happy to provide you with this summary o [place] [facility] Visit By Therapist with Susana Curiel, PT   [facility] in [place] ([address] [facility],[address])    150 [facility] [phone]           Please arrive at your scheduled appointment time.   Wear co

## (undated) NOTE — MR AVS SNAPSHOT
[address]  [address] [place] [phone]               Thank you for choosing us for your health care visit with Phyllistine Burkitt, PT.   We are glad to serve you and happy to provide you with this summary o [facility], [address] [facility] [address]  ([place])    [address]   [phone]            Jun 23, 2017  8:30 AM   [place] [facility] Visit By Therapist with Megan Mina PT

## (undated) NOTE — LETTER
[facility]  Administration of Anesthesia  1. Rachelle Ness, or _________________________________ acting on her behalf, (Patient) (Dependent/Representative) request to receive anesthesia for my pending procedure/operation/treatment. infections, high spinal block, spinal bleeding, seizure, cardiac arrest and death. 7. AWARENESS: I understand that it is possible (but unlikely) to have explicit memory of events from the operating room while under general anesthesia.   8. ELECTROCONVULSIV unconscious pt /Relationship    My signature below affirms that prior to the time of the procedure, I have explained to the patient and/or his/her guardian, the risks and benefits of undergoing anesthesia, as well as any reasonable alternatives.     _______

## (undated) NOTE — MR AVS SNAPSHOT
[address]  [address] [place] [phone]               Thank you for choosing us for your health care visit with Man Munoz PT.   We are glad to serve you and happy to provide you with this summary o [place] [facility] Therapy Visit By Therapist with Alison Saunders PT   [facility] in [address] [place] [address] ([address] [facility],[address])    150 [facility] [phone]           Please arrive at your scheduled appointment time.   Wear co

## (undated) NOTE — LETTER
[facility] [employer] of Child Health Examination       Student's Name  Shira Bowling Title                           Date     Signature HEALTH HISTORY          TO BE COMPLETED AND SIGNED BY PARENT/GUARDIAN AND VERIFIED BY HEALTH CARE PROVIDER    ALLERGIES  (Food, drug, insect, other)  Patient has no known allergies.  MEDICATION  (List all prescribed or taken on a regular basis.)  No current Temp 97.4 °F (36.3 °C) (Tympanic)   Resp 20   Ht 5' 5\" (1.651 m)   Wt 81.6 kg (179 lb 12.8 oz)   BMI 29.92 kg/m²     DIABETES SCREENING  BMI>85% age/sex  Yes And any two of the following:  Family History Yes    Ethnic Minority  Yes          Signs of Insul Respiratory Yes                   Diagnosis of Asthma: No Mental Health Yes        Currently Prescribed Asthma Medication:            Quick-relief  medication (e.g. Short Acting Beta Antagonist): No          Controller medication (e.g. inhaled corticostero

## (undated) NOTE — MR AVS SNAPSHOT
[place]  [address]  [phone]               Thank you for choosing us for your health care visit with Bharit Ramachandran MD.  We are glad to serve you and happy to provide you with this summa [facility]    It is the patient's responsibility to check with and follow their insurance company's guidelines for prior authorization for this test.  You may be held responsible for payment in full if

## (undated) NOTE — MR AVS SNAPSHOT
[place]  [address]  [phone]               Thank you for choosing us for your health care visit with Brittanie Tilley MD.  We are glad to serve you and happy to provide you with this aldana

## (undated) NOTE — LETTER
5/11/2018          To Whom It May Concern:    Kirill Simms is currently under my medical care and may return to school on 05/14/2018. Activity is restricted as follows: No heavy lifting over 15 pounds.   May resume all activities with no restrictions o

## (undated) NOTE — LETTER
VACCINE ADMINISTRATION RECORD  PARENT / GUARDIAN APPROVAL  Date: 10/7/2017  Vaccine administered to: Stephani Hernandez     : 2005    MRN: [id number]    A copy of the appropriate Centers for Disease Control and Prevention Vaccine Information statement

## (undated) NOTE — LETTER
[facility] Examination       Student's Name  Eduardo FLORES Title                           Date     Signature HEALTH HISTORY          TO BE COMPLETED AND SIGNED BY PARENT/GUARDIAN AND VERIFIED BY HEALTH CARE PROVIDER    ALLERGIES  (Food, drug, insect, other)  Review of patient's allergies indicates no known allergies.  MEDICATION  (List all prescribed or taken on a PHYSICAL EXAMINATION REQUIREMENTS (head circumference if <33 years old):   /80   Pulse 91   Ht 5' 4.5\" (1.638 m)   Wt 75.4 kg (166 lb 4 oz)   BMI 28.10 kg/m²     DIABETES SCREENING  BMI>85% age/sex  yes And any two of the following:  Family History Respiratory Yes                   Diagnosis of Asthma: No Mental Health Yes        Currently Prescribed Asthma Medication:            Quick-relief  medication (e.g. Short Acting Beta Antagonist): No          Controller medication (e.g. inhaled corticostero

## (undated) NOTE — Clinical Note
April 26, 2017      To whom it may concern:     This is to certify that Aleida Lomeli, YOB: 2005:    Exclude gym/physical education activities, Release from class 5 minutes early, to help get to the next class on time and Allow use of e

## (undated) NOTE — LETTER
Name:  Princess Wood Year:  10th Grade Class: Student ID No.:   Address:  [address] [place] [address]  [address] [facility] [address] Phone:  [phone] (home)  :  12year old   Name Relationship [address] Work Phone Home Phone Mobile Phone   1.  Sofia Moreno implanted defibrillator? 12. Has anyone in your family had unexplained fainting, seizures, or near drowning?      BONE AND JOINT QUESTIONS Yes No   17. Have you ever had an injury to a bone, muscle, ligament, or tendon that caused you to miss a practice 39.Have you ever been unable to move your arms / legs after being hit /fall? 40. Have you ever become ill while exercising in the heat?     41. Do you get frequent muscle cramps when exercising? 42.  Do you or someone in your family have sickle cell Heart*  · Murmurs (auscultation standing, supine, +/- Valsalva)  · Location of point of maximal impulse (PMI) Yes    Pulses Yes    Lungs Yes    Abdomen Yes    Genitourinary (males only)* N/A    Skin:  HSV, lesions suggestive of MRSA, tinea corporis Yes Protocol.  We have reviewed the policy and understand that I/our student may be asked to submit to testing for the presence of performance-enhancing substances in my/his/her body either during [employer] state series events or during the school day, and I/our rommel

## (undated) NOTE — MR AVS SNAPSHOT
[address]  [address] [place] [phone]               Thank you for choosing us for your health care visit with Pastor Lin PT.   We are glad to serve you and happy to provide you with this summary o [place] [facility] Visit By Therapist with [facility] in [address] [place] [address] ([address] [facility],[address])    150 [facility] [phone]           Please arrive at your scheduled appointment time.   We

## (undated) NOTE — Clinical Note
May 17, 2017      To whom it may concern: This is to certify that Soco Briscoe, YOB: 2005:    Exclude gym/physical education activities but to walk with crutches.  No wheelchair    The patient was seen on 5/17/2017 by David Mendoza

## (undated) NOTE — Clinical Note
May 3, 2017      To whom it may concern:     This is to certify that Risa Markel, YOB: 2005:    Exclude gym/physical education activities, Please issue second set of text books due to lifting limitations, Release from class 5 minutes

## (undated) NOTE — MR AVS SNAPSHOT
[address]  [address] [place] [phone]               Thank you for choosing us for your health care visit with Bridget Melo PT.   We are glad to serve you and happy to provide you with this summary o [place] [address]   [phone]           Please arrive at your scheduled appointment time. Wear comfortable, loose fitting clothing.             Jun 29, 2017  6:15 PM   [place] [facility] Visit By Therapist with Alton Bence, PTA Eduardo Grebe

## (undated) NOTE — MR AVS SNAPSHOT
After Visit Summary   4/13/2017    Daniel Horvath    MRN: [id number]           Visit Information        Provider Department Dept Phone    4/13/2017  1:00 PM Cha Zuniga MD [facility] [phone]      Allergies as of 4/13/2017  Rev conditions such as allergies, colds, cough, fever, rash, sore throat, headache and pink eye. The cost for a Video Visit is currently $10.         If you receive a survey from [employer], please take a few minutes to complete it and provide feedback.  We s

## (undated) NOTE — Clinical Note
4/13/2017          To Whom It May Concern:    Ying Olivera is currently under my medical care and may not return to school at this time. Please excuse Jacquie Salgado for 4 days. She may return to school on Monday April 17, 2017.   Activity is restricted as

## (undated) NOTE — LETTER
9/14/2017          To Whom It May Concern:    Dafne Carrillo is currently under my medical care and may not return to { school/work:[id number]} at this time. Please excuse Edna Escalante for {NUMBERS 0-10:3282} {days weeks:3323::\"days\"}.   {HE/SHE :9550} m